# Patient Record
Sex: FEMALE | Race: WHITE | Employment: PART TIME | URBAN - NONMETROPOLITAN AREA
[De-identification: names, ages, dates, MRNs, and addresses within clinical notes are randomized per-mention and may not be internally consistent; named-entity substitution may affect disease eponyms.]

---

## 2017-08-24 ENCOUNTER — HOSPITAL ENCOUNTER (OUTPATIENT)
Dept: PHYSICAL THERAPY | Age: 42
Discharge: HOME | End: 2017-08-31
Admitting: ORTHOPAEDIC SURGERY

## 2018-01-05 ENCOUNTER — HOSPITAL ENCOUNTER (OUTPATIENT)
Dept: MAMMOGRAPHY | Age: 43
Discharge: HOME | End: 2018-01-05
Attending: OBSTETRICS & GYNECOLOGY | Admitting: OBSTETRICS & GYNECOLOGY

## 2018-01-05 DIAGNOSIS — Z12.31 ENCOUNTER FOR SCREENING MAMMOGRAM FOR BREAST CANCER: ICD-10-CM

## 2018-01-15 ENCOUNTER — HOSPITAL ENCOUNTER (OUTPATIENT)
Dept: MAMMOGRAPHY | Age: 43
Discharge: HOME | End: 2018-01-15
Admitting: NURSE PRACTITIONER

## 2018-01-15 DIAGNOSIS — R92.8 OTHER ABNORMAL AND INCONCLUSIVE FINDINGS ON DIAGNOSTIC IMAGING OF BREAST: ICD-10-CM

## 2018-01-15 DIAGNOSIS — Z12.31 ENCOUNTER FOR SCREENING MAMMOGRAM FOR BREAST CANCER: ICD-10-CM

## 2018-02-14 ENCOUNTER — HOSPITAL ENCOUNTER (OUTPATIENT)
Dept: PHYSICAL THERAPY | Age: 43
Discharge: HOME | End: 2018-02-28
Admitting: NURSE PRACTITIONER

## 2018-02-14 NOTE — FLOWSHEET NOTE
face-to-face)  [] EVAL (HIGH) 75699 (typically 45 minutes face-to-face)  [] RE-EVAL     [x] LN(55394) x  2   [] Ionto  [] NMR (77146) x      [] Vaso  [] Manual (57468) x       [] Mech Traction  [] ES (unattended):   [] Other:     GOALS:  Therapist goals for Patient:   Short Term Goals: To be achieved in: 2 weeks  1. Independent in HEP and progression per patient tolerance, in order to prevent re-injury. 2. Patient will have a decrease in pain to facilitate improvement in movement, function, and ADLs as indicated by Functional Deficits.     Long Term Goals: To be achieved in: 8 weeks  1. Disability index score of 10% or less for the Quick Dash to assist with reaching prior level of function. 2. Patient will demonstrate increased AROM to equal the opposite side bilaterally to allow for proper joint functioning as indicated by patients Functional Deficits. 3. Patient will demonstrate an increase in strength to UE MMT scores to equal bilaterally to allow for proper functional mobility as indicated by patients Functional Deficits. 4. Patient will return to all transfers, work activities, and functional activities without increased symptoms or restriction. 5. Patient will have 0/10 pain with ADL's.  6. Patient stated goal: Lift, type, sleep w/ no disturbances. Goals that are underlined signify the goal has been accomplished. Progression Towards Functional goals:  [] Patient is progressing as expected towards functional goals listed. [] Progression is slowed due to complexities listed. [] Progression has been slowed due to co-morbidities.   [x] Plan just implemented, too soon to assess goals progression  [] Other:     ASSESSMENT:  See eval    Treatment/Activity Tolerance:   [x] Patient tolerated treatment well [] Patient limited by fatique  [] Patient limited by pain  [] Patient limited by other medical complications  [] Other:     Prognosis: [] Good [] Fair  [] Poor    Patient Requires Follow-up: [x]

## 2018-02-14 NOTE — PLAN OF CARE
[facility], [address] [place] [address] [address] [facility], [address], [place], [address] [place] [address]  Phone: [phone], [phone]                                                    Physical Therapy Certification    Dear Referring Practitioner: David Lopez CNP,    We had the pleasure of evaluating the following patient for physical therapy services at [address] [place] [address]. A summary of our findings can be found in the initial assessment below. This includes our plan of care. If you have any questions or concerns regarding these findings, please do not hesitate to contact me at the office phone number checked above. Thank you for the referral.       Physician Signature:_______________________________Date:__________________  By signing above (or electronic signature), therapists plan is approved by physician      Patient: Anne Johnson   : 1975   MRN: [id number]  Referring Physician: Referring Practitioner: David Lopez CNP      Evaluation Date: 2018      Medical Diagnosis Information:  Diagnosis: R lateral epicondylitis    Treatment Diagnosis: M77.11 M25.621                                         Insurance information: PT Insurance Information: Med mutual    Precautions/ Contra-indications/Relevant Medical History:   Latex Allergy:  [x]NO      []YES   Preferred Language for Healthcare:   [x]English       []other:     SUBJECTIVE: Patient stated complaint: Patient reports right elbow pain beginning in  (early). She reports no known mechanism of injury but started to notice some discomfort when pouring something, lifting, opening a jar, etc. She was given an oral anti inflammatory which she reports isn't helping at this time. No N/T at this time, no previous neck neck pain, EMG came back negative. Just purchased a new chair for her work and is going to see if this new position will help her elbow.      Functional Disability Index: PT G-Codes  Functional Assessment Tool Used: Villarreal Decent  Score: 48%  Functional Limitation: Carrying, moving and handling objects  Carrying, Moving and Handling Objects Current Status (): At least 40 percent but less than 60 percent impaired, limited or restricted  Carrying, Moving and Handling Objects Goal Status (): 0 percent impaired, limited or restricted    Pain Scale: 6/10  Easing factors: avoiding usage   Provocative factors: opening jar, lifting, sleeping     Type: [x]Constant   []Intermittent  []Radiating []Localized []other:     Numbness/Tingling: None    Functional Limitations/Impairments: [x]Lifting/reaching [x]Grooming [x]Carrying    [x]ADL's []Driving []Sports/Recreations   []Other:    Occupation/School:     Living Status/Prior Level of Function: Independent with ADLs and IADLs. OBJECTIVE:     CERV ROM     Cervical Flexion     Cervical Extension     Cervical SB     Cervical rotation          ROM Left Right   Shoulder Flex 165 °  165 °    Shoulder Abd     Shoulder ER T2 T2   Shoulder IR T7 T7   Elbow Flex     Elbow Ext     Wrist Flex 78 °  70 °    Wrist Ext 70 °  70 °    Strength  Left Right   Shoulder Flex 8.8 lbs 7.4 lbs   Shoulder Scap 8 lbs 7.6 lbs   Shoulder ER 10 lbs 9 lbs   Shoulder IR     Elbow Flex     Elbow Ext     Wrist Flex     Wrist Ext     Afshin  38 lbs 25 lbs     Reflexes/Sensation (myotomes/dermatomes):    []Normal    []Abnormal:      Joint mobility:    []Normal    []Hypo   []Hyper    Palpation: Pain over the lateral epicondyle     Functional Mobility/Transfers: WNL    Posture: WNL    Bandages/Dressings/Incisions: na    Gait (include devices/WB status): No bracing    Orthopedic Special Tests: [place] (+)    [x] Patient history, allergies, meds reviewed. Medical chart reviewed. See intake form. Review Of Systems (ROS):  [x]Performed Review of systems (Integumentary, CardioPulmonary, Neurological) by intake and observation.  Intake form has been scanned into medical record. Patient has been instructed to contact their primary care physician regarding ROS issues if not already being addressed at this time. Co-morbidities/Complexities (which will affect course of rehabilitation):   [x]None           Arthritic conditions   []Rheumatoid arthritis (M05.9)  []Osteoarthritis (M19.91)   Cardiovascular conditions   []Hypertension (I10)  []Hyperlipidemia (E78.5)  []Angina pectoris (I20)  []Atherosclerosis (I70)   Musculoskeletal conditions   []Disc pathology   []Congenital spine pathologies   []Prior surgical intervention  []Osteoporosis (M81.8)  []Osteopenia (M85.8)   Endocrine conditions   []Hypothyroid (E03.9)  []Hyperthyroid Gastrointestinal conditions   []Constipation (N35.40)   Metabolic conditions   []Morbid obesity (E66.01)  []Diabetes type 1(E10.65) or 2 (E11.65)   []Neuropathy (G60.9)     Pulmonary conditions   []Asthma (J45)  []Coughing   []COPD (J44.9)   Psychological Disorders  []Anxiety (F41.9)  []Depression (F32.9)   []Other:   []Other:          Barriers to/and or personal factors that will affect rehab potential:              []Age  []Sex              []Motivation/Lack of Motivation                        []Co-Morbidities              []Cognitive Function, education/learning barriers              []Environmental, home barriers              [x]profession/work barriers  []past PT/medical experience  []other:  Justification: Full time     Falls Risk Assessment (30 days):   [x] Falls Risk assessed and no intervention required. [] Falls Risk assessed and Patient requires intervention due to being higher risk   TUG score (>12s at risk):     [] Falls education provided, including       G-Codes:  PT G-Codes  Functional Assessment Tool Used: Quick Dash  Score: 48%  Functional Limitation: Carrying, moving and handling objects  Carrying, Moving and Handling Objects Current Status ():  At least 40 percent but less than 60 percent impaired, limited or restricted  Carrying, Moving and Handling Objects Goal Status (): 0 percent impaired, limited or restricted    ASSESSMENT:   Functional Impairments   []Noted spinal or UE joint hypomobility   []Noted spinal or UE joint hypermobility   [x]Decreased UE functional ROM   [x]Decreased UE functional strength   []Abnormal reflexes/sensation/myotomal/dermatomal deficits   []Decreased RC/scapular/core strength and neuromuscular control   []other:      Functional Activity Limitations (from functional questionnaire and intake)   []Reduced ability to tolerate prolonged functional positions   []Reduced ability or difficulty with changes of positions or transfers between positions   []Reduced ability to maintain good posture and demonstrate good body mechanics with sitting, bending, and lifting   [x] Reduced ability or tolerance with driving and/or computer work   [x]Reduced ability to sleep   []Reduced ability to perform lifting, reaching, carrying tasks   []Reduced ability to tolerate impact through UE   []Reduced ability to reach behind back   [x]Reduced ability to  or hold objects   []Reduced ability to throw or toss an object   []other:    Participation Restrictions   []Reduced participation in self care activities   [x]Reduced participation in home management activities   [x]Reduced participation in work activities   [x]Reduced participation in social activities. []Reduced participation in sport/recreation activities. Classification:   []Signs/symptoms consistent with post-surgical status including decreased ROM, strength and function.   [x]Signs/symptoms consistent with joint sprain/strain   []Signs/symptoms consistent with shoulder impingement   [x]Signs/symptoms consistent with shoulder/elbow/wrist tendinopathy   []Signs/symptoms consistent with Rotator cuff tear   []Signs/symptoms consistent with labral tear   []Signs/symptoms consistent with postural dysfunction    []Signs/symptoms consistent with

## 2018-02-22 ENCOUNTER — HOSPITAL ENCOUNTER (OUTPATIENT)
Dept: PHYSICAL THERAPY | Age: 43
Discharge: HOME | End: 2018-02-23
Admitting: NURSE PRACTITIONER

## 2018-02-22 NOTE — FLOWSHEET NOTE
Therapeutic Exercise and NMR EXR  [x] (60742) Provided verbal/tactile cueing for activities related to strengthening, flexibility, endurance, ROM  for improvements in scapular, scapulothoracic and UE control with self care, reaching, carrying, lifting, house/yardwork, driving/computer work. [x] (56767) Provided verbal/tactile cueing for activities related to improving balance, coordination, kinesthetic sense, posture, motor skill, proprioception  to assist with  scapular, scapulothoracic and UE control with self care, reaching, carrying, lifting, house/yardwork, driving/computer work.     Home Exercise Program:    [x] (89272) Reviewed/Progressed HEP activities related to strengthening, flexibility, endurance, ROM of scapular, scapulothoracic and UE control with self care, reaching, carrying, lifting, house/yardwork, driving/computer work  [x] (03718) Reviewed/Progressed HEP activities related to improving balance, coordination, kinesthetic sense, posture, motor skill, proprioception of scapular, scapulothoracic and UE control with self care, reaching, carrying, lifting, house/yardwork, driving/computer work      Manual Treatments:  PROM / STM / Oscillations-Mobs:  G-I, II, III, IV (PA's, Inf., Post.)  [x] (89946) Provided manual therapy to mobilize soft tissue/joints of cervical/CT, scapular GHJ and UE for the purpose of modulating pain, promoting relaxation,  increasing ROM, reducing/eliminating soft tissue swelling/inflammation/restriction, improving soft tissue extensibility and allowing for proper ROM for normal function with self care, reaching, carrying, lifting, house/yardwork, driving/computer work    Modalities:  CP 10' / Soraida Shrestha take home patch     Charges:  Timed Code Treatment Minutes: 45   Total Treatment Minutes: 60     [] EVAL (LOW) 83105 (typically 20 minutes face-to-face)  [] EVAL (MOD) 90323 (typically 30 minutes face-to-face)  [] EVAL (HIGH) 86054 (typically 45 minutes face-to-face)  []

## 2018-03-01 ENCOUNTER — HOSPITAL ENCOUNTER (OUTPATIENT)
Dept: PHYSICAL THERAPY | Age: 43
Discharge: HOME | End: 2018-03-02
Admitting: NURSE PRACTITIONER

## 2018-03-01 ENCOUNTER — HOSPITAL ENCOUNTER (OUTPATIENT)
Dept: PHYSICAL THERAPY | Age: 43
Discharge: HOME | End: 2018-03-31
Attending: NURSE PRACTITIONER | Admitting: NURSE PRACTITIONER

## 2018-03-01 NOTE — FLOWSHEET NOTE
strengthening     PNF for agonist/antagonist inhibition          Pt education 10 min Provided biomechanics/ergonomics training to reduce stress across injured/healing structures. Ionto education     Therapeutic Exercise and NMR EXR  [x] (16767) Provided verbal/tactile cueing for activities related to strengthening, flexibility, endurance, ROM  for improvements in scapular, scapulothoracic and UE control with self care, reaching, carrying, lifting, house/yardwork, driving/computer work. [x] (48129) Provided verbal/tactile cueing for activities related to improving balance, coordination, kinesthetic sense, posture, motor skill, proprioception  to assist with  scapular, scapulothoracic and UE control with self care, reaching, carrying, lifting, house/yardwork, driving/computer work.     Home Exercise Program:    [x] (33835) Reviewed/Progressed HEP activities related to strengthening, flexibility, endurance, ROM of scapular, scapulothoracic and UE control with self care, reaching, carrying, lifting, house/yardwork, driving/computer work  [x] (01322) Reviewed/Progressed HEP activities related to improving balance, coordination, kinesthetic sense, posture, motor skill, proprioception of scapular, scapulothoracic and UE control with self care, reaching, carrying, lifting, house/yardwork, driving/computer work      Manual Treatments:  PROM / STM / Oscillations-Mobs:  G-I, II, III, IV (PA's, Inf., Post.)  [x] (52855) Provided manual therapy to mobilize soft tissue/joints of cervical/CT, scapular GHJ and UE for the purpose of modulating pain, promoting relaxation,  increasing ROM, reducing/eliminating soft tissue swelling/inflammation/restriction, improving soft tissue extensibility and allowing for proper ROM for normal function with self care, reaching, carrying, lifting, house/yardwork, driving/computer work    Modalities:  CP 10' / Su Raines take home patch     Charges:  Timed Code Treatment Minutes: 45   Total Treatment Minutes: 60     [] EVAL (LOW) 84540 (typically 20 minutes face-to-face)  [] EVAL (MOD) 94188 (typically 30 minutes face-to-face)  [] EVAL (HIGH) 43994 (typically 45 minutes face-to-face)  [] RE-EVAL     [x] SX(58041) x  2   [x] Ionto  [] NMR (83383) x      [] Vaso  [x] Manual (11674) x  1    [] Mech Traction  [] ES (unattended):   [] Other:     GOALS:  Therapist goals for Patient:   Short Term Goals: To be achieved in: 2 weeks  1. Independent in HEP and progression per patient tolerance, in order to prevent re-injury. 2. Patient will have a decrease in pain to facilitate improvement in movement, function, and ADLs as indicated by Functional Deficits.     Long Term Goals: To be achieved in: 8 weeks  1. Disability index score of 10% or less for the Quick Dash to assist with reaching prior level of function. 2. Patient will demonstrate increased AROM to equal the opposite side bilaterally to allow for proper joint functioning as indicated by patients Functional Deficits. 3. Patient will demonstrate an increase in strength to UE MMT scores to equal bilaterally to allow for proper functional mobility as indicated by patients Functional Deficits. 4. Patient will return to all transfers, work activities, and functional activities without increased symptoms or restriction. 5. Patient will have 0/10 pain with ADL's.  6. Patient stated goal: Lift, type, sleep w/ no disturbances. Goals that are underlined signify the goal has been accomplished. Progression Towards Functional goals:  [] Patient is progressing as expected towards functional goals listed. [x] Progression is slowed due to complexities listed. [] Progression has been slowed due to co-morbidities.   [] Plan just implemented, too soon to assess goals progression  [] Other:     ASSESSMENT:  See eval    Treatment/Activity Tolerance:   [x] Patient tolerated treatment well [] Patient limited by fatique  [] Patient limited by pain  [] Patient limited by other medical complications  [] Other:     Prognosis: [] Good [] Fair  [] Poor    Patient Requires Follow-up: [x] Yes  [] No    PLAN: See eval  [x] Continue per plan of care [] Alter current plan (see comments)  [] Plan of care initiated [] Hold pending MD visit [] Discharge    Electronically signed by: Marylu Boogie PTA

## 2018-03-07 ENCOUNTER — HOSPITAL ENCOUNTER (OUTPATIENT)
Dept: PHYSICAL THERAPY | Age: 43
Discharge: HOME | End: 2018-03-08
Admitting: NURSE PRACTITIONER

## 2018-04-01 ENCOUNTER — HOSPITAL ENCOUNTER (OUTPATIENT)
Dept: PHYSICAL THERAPY | Age: 43
Discharge: HOME | End: 2018-04-30
Attending: NURSE PRACTITIONER | Admitting: NURSE PRACTITIONER

## 2019-02-08 ENCOUNTER — HOSPITAL ENCOUNTER (OUTPATIENT)
Dept: MAMMOGRAPHY | Age: 44
Discharge: HOME | End: 2019-02-08
Payer: COMMERCIAL

## 2019-02-08 DIAGNOSIS — Z12.31 SCREENING MAMMOGRAM, ENCOUNTER FOR: ICD-10-CM

## 2019-02-08 PROCEDURE — 77067 SCR MAMMO BI INCL CAD: CPT

## 2020-07-17 ENCOUNTER — TELEPHONE (OUTPATIENT)
Dept: MAMMOGRAPHY | Age: 45
End: 2020-07-17

## 2020-07-17 ENCOUNTER — HOSPITAL ENCOUNTER (OUTPATIENT)
Dept: MAMMOGRAPHY | Age: 45
Discharge: HOME | End: 2020-07-17
Payer: COMMERCIAL

## 2020-07-17 PROCEDURE — 77063 BREAST TOMOSYNTHESIS BI: CPT

## 2021-08-02 ENCOUNTER — HOSPITAL ENCOUNTER (OUTPATIENT)
Dept: MAMMOGRAPHY | Age: 46
Discharge: HOME | End: 2021-08-02
Payer: COMMERCIAL

## 2021-08-02 DIAGNOSIS — Z12.31 SCREENING MAMMOGRAM, ENCOUNTER FOR: ICD-10-CM

## 2021-08-02 PROCEDURE — 77063 BREAST TOMOSYNTHESIS BI: CPT

## 2021-08-03 ENCOUNTER — TELEPHONE (OUTPATIENT)
Dept: MAMMOGRAPHY | Age: 46
End: 2021-08-03

## 2022-08-30 ENCOUNTER — HOSPITAL ENCOUNTER (OUTPATIENT)
Dept: MAMMOGRAPHY | Age: 47
Discharge: HOME | End: 2022-08-30
Payer: COMMERCIAL

## 2022-08-30 ENCOUNTER — TELEPHONE (OUTPATIENT)
Dept: MAMMOGRAPHY | Age: 47
End: 2022-08-30

## 2022-08-30 DIAGNOSIS — Z12.39 SCREENING BREAST EXAMINATION: ICD-10-CM

## 2022-08-30 PROCEDURE — 77063 BREAST TOMOSYNTHESIS BI: CPT

## 2023-01-27 ENCOUNTER — HOSPITAL ENCOUNTER (OUTPATIENT)
Dept: PHYSICAL THERAPY | Age: 48
Setting detail: THERAPIES SERIES
Discharge: HOME | End: 2023-01-27
Payer: COMMERCIAL

## 2023-01-27 PROCEDURE — 97161 PT EVAL LOW COMPLEX 20 MIN: CPT

## 2023-01-27 PROCEDURE — 97140 MANUAL THERAPY 1/> REGIONS: CPT

## 2023-01-27 PROCEDURE — 97110 THERAPEUTIC EXERCISES: CPT

## 2023-01-27 NOTE — PLAN OF CARE
[address], [address], [address]  Phone: [phone], [phone]    Physical Therapy Certification    Dear Puneet Jama,*,    We had the pleasure of evaluating the following patient for physical therapy services at [address] [place] [address]. A summary of our findings can be found in the initial assessment below. This includes our plan of care. If you have any questions or concerns regarding these findings, please do not hesitate to contact me at the office phone number checked above. Thank you for the referral.       Physician Signature:_______________________________Date:__________________  By signing above (or electronic signature), therapists plan is approved by physician    Patient: Miguel Barnett   : 1975   MRN: [id number]  Referring Physician: Puneet Jama,*     Evaluation Date: 2023      Medical Diagnosis Information:  Diagnosis: Low back pain M54.5   Treatment Diagnosis: M54.5 low back pain; generalized weakness M62.8                                         Insurance information: PT Insurance Information: [place] DED $550 BMN no auth     Precautions/ Contra-indications: none  Latex Allergy:  [x]NO      []YES  Preferred Language for Healthcare:   [x]English       []Other:    C-SSRS Triggered by Intake questionnaire (Past 2 wk assessment):   [x] No, Questionnaire did not trigger screening.   [] Yes, Patient intake triggered further evaluation      [] C-SSRS Screening completed  [] PCP notified via Plan of Care  [] Emergency services notified     SUBJECTIVE: Patient stated complaint: Patient reports she has chronic low back pain from scoliosis. Feels it pinching on left side. No pain down legs. Chiropractor about 6 months and has not gotten much help. [facility] and had [facility].  Has not tried formal PT in the past.     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 Returning upright    extension 80%   side bend P! To left 50%     Prone exam Comments   PA/spring P! Left lumbar L3-4   Sacral spring Normal   Palpation P! L L3-4 paraspinals     Reflexes/Sensation:    []Dermatomes/Myotomes intact    []UE Reflexes     []Normal []Hypo      []Hyper   []LE Reflexes     []Normal []Hypo      []Hyper   []Babinski/Clonus/Hoffmans:    []Other:    Functional Mobility/Transfers: independent     Posture: anterior pelvic tilt    Gait: (include devices/WB status) slight wide NAGI    Orthopedic Special Tests: none                       [x] Patient history, allergies, meds reviewed. Medical chart reviewed. See intake form. Review Of Systems (ROS):  [x]Performed Review of systems (Integumentary, CardioPulmonary, Neurological) by intake and observation. Intake form has been scanned into medical record. Patient has been instructed to contact their primary care physician regarding ROS issues if not already being addressed at this time.       Co-morbidities/Complexities (which will affect course of rehabilitation):   []None           Arthritic conditions   []Rheumatoid arthritis (M05.9)  [x]Osteoarthritis (M19.91)   Cardiovascular conditions   []Hypertension (I10)  []Hyperlipidemia (E78.5)  []Angina pectoris (I20)  []Atherosclerosis (I70)   Musculoskeletal conditions   []Disc pathology   []Congenital spine pathologies   []Prior surgical intervention  []Osteoporosis (M81.8)  []Osteopenia (M85.8)   Endocrine conditions   []Hypothyroid (E03.9)  []Hyperthyroid Gastrointestinal conditions   []Constipation (D93.64)   Metabolic conditions   []Morbid obesity (E66.01)  []Diabetes type 1(E10.65) or 2 (E11.65)   []Neuropathy (G60.9)     Pulmonary conditions   []Asthma (J45)  []Coughing   []COPD (J44.9)   Psychological Disorders  []Anxiety (F41.9)  []Depression (F32.9)   []Other:   []Other:          Barriers to/and or personal factors that will affect rehab potential:              [x]Age  []Sex              [x]Motivation/Lack of Motivation                        [x]Co-Morbidities              []Cognitive Function, education/learning barriers              []Environmental, home barriers              []profession/work barriers  [x]past PT/medical experience  []other:  Justification:      ASSESSMENT:   Functional Impairments:     [x]Noted lumbar/proximal hip hypomobility   []Noted lumbosacral and/or generalized hypermobility   [x]Decreased Lumbosacral/hip/LE functional ROM   [x]Decreased core/proximal hip strength and neuromuscular control    [x]Decreased LE functional strength    []Abnormal reflexes/sensation/myotomal/dermatomal deficits  [x]Reduced balance/proprioceptive control    []other:      Functional Activity Limitations (from functional questionnaire and intake)   [x]Reduced ability to tolerate prolonged functional positions   [x]Reduced ability or difficulty with changes of positions or transfers between positions   [x]Reduced ability to maintain good posture and demonstrate good body mechanics with sitting, bending, and lifting   [x]Reduced ability to sleep   [x] Reduced ability or tolerance with driving and/or computer work   [x]Reduced ability to perform lifting, reaching, carrying tasks   [x]Reduced ability to squat   [x]Reduced ability to forward bend   [x]Reduced ability to ambulate prolonged functional periods/distances/surfaces   [x]Reduced ability to ascend/descend stairs   []other:       Participation Restrictions   [x]Reduced participation in self care activities   [x]Reduced participation in home management activities   [x]Reduced participation in work activities   [x]Reduced participation in social activities. [x]Reduced participation in sport/recreation activities. Classification:   [x]Signs/symptoms consistent with Lumbar instability/stabilization subgroup. [x]Signs/symptoms consistent with Lumbar mobilization/manipulation subgroup, myotomes and dermatomes intact. Meets manipulation criteria. []Signs/symptoms consistent with Lumbar direction specific/centralization subgroup   []Signs/symptoms consistent with Lumbar traction subgroup     []Signs/symptoms consistent with lumbar facet dysfunction   []Signs/symptoms consistent with lumbar stenosis type dysfunction   []Signs/symptoms consistent with nerve root involvement including myotome & dermatome dysfunction   []Signs/symptoms consistent with post-surgical status including: decreased ROM, strength and function.    []signs/symptoms consistent with pathology which may benefit from Dry needling     []other:      Prognosis/Rehab Potential:      []Excellent   [x]Good    []Fair   []Poor    Tolerance of evaluation/treatment:    []Excellent   [x]Good    []Fair   []Poor  Physical Therapy Evaluation Complexity Justification  [x] A history of present problem with:  [] no personal factors and/or comorbidities that impact the plan of care;  []1-2 personal factors and/or comorbidities that impact the plan of care  [x]3 personal factors and/or comorbidities that impact the plan of care  [x] An examination of body systems using standardized tests and measures addressing any of the following: body structures and functions (impairments), activity limitations, and/or participation restrictions;:  [] a total of 1-2 or more elements   [] a total of 3 or more elements   [x] a total of 4 or more elements   [x] A clinical presentation with:  [x] stable and/or uncomplicated characteristics   [] evolving clinical presentation with changing characteristics  [] unstable and unpredictable characteristics;   [x] Clinical decision making of [x] low, [] moderate, [] high complexity using standardized patient assessment instrument and/or measurable assessment of functional outcome. [x] EVAL (LOW) 56782 (typically 20 minutes face-to-face)  [] EVAL (MOD) 11775 (typically 30 minutes face-to-face)  [] EVAL (HIGH) 67111 (typically 45 minutes face-to-face)  [] RE-EVAL     PLAN: Begin PT focusing on: proximal hip mobilizations, LB mobs, LB core activation, proximal hip activation, and HEP    Frequency/Duration:  1-2 days per week for 12 Weeks:  Interventions:  [x]  Therapeutic exercise including: strength training, ROM, for LE, Glutes and core   [x]  NMR activation and proprioception for glutes , LE and Core   [x]  Manual therapy as indicated for Hip complex, LE and spine to include: Dry Needling/IASTM, STM, PROM, Gr I-IV mobilizations, manipulation. [x]  Modalities as needed that may include: thermal agents, E-stim, Biofeedback, US, iontophoresis as indicated  [x]  Patient education on joint protection, postural re-education, activity modification, progression of HEP. GOALS:    Patient stated goal: Pain free activity and care for grandchildren   [] Progressing: [] Met: [] Not Met: [] Adjusted     Therapist goals for Patient:   Short Term Goals: To be achieved in: 2 weeks  1. Independent in HEP and progression per patient tolerance, in order to prevent re-injury. [] Progressing: [] Met: [] Not Met: [] Adjusted   2. Patient will have a decrease in pain to facilitate improvement in movement, function, and ADLs as indicated by Functional Deficits. [] Progressing: [] Met: [] Not Met: [] Adjusted     Long Term Goals: To be achieved in: 12 weeks  1. Disability index score of 22% or less on oswestry to assist with reaching prior level of function. [] Progressing: [] Met: [] Not Met: [] Adjusted   2.  Patient will demonstrate increased AROM to WNL, good LS mobility, good hip ROM to allow for proper joint functioning as indicated by patients Functional Deficits. [] Progressing: [] Met: [] Not Met: [] Adjusted   3. Patient will demonstrate an increase in Strength to good proximal hip and core activation to allow for proper functional mobility as indicated by patients Functional Deficits. [] Progressing: [] Met: [] Not Met: [] Adjusted   4. Patient will return to dressing and hygiene ADLs  without increased symptoms or restriction.    [] Progressing: [] Met: [] Not Met: [] Adjusted           Electronically signed by:  Loraine Perez, PT DPT [id number]

## 2023-01-27 NOTE — FLOWSHEET NOTE
[address] [facility] and [address] [place] [address] [address] [facility], [address] [place] [address]  Phone: [phone], [phone]    Physical Therapy Daily Treatment Note  Date:  2023    Patient Name:  Ely Bernheim    :  1975  MRN: [id number]  Restrictions/Precautions:    Physician Information:  Milka Chowdhury,*  Medical/Treatment Diagnosis Information:  Diagnosis: Low back pain M54.5  Treatment Diagnosis: M54.5 low back pain; generalized weakness M62.8  [x] Conservative / [] Surgical - DOS:  Therapy Diagnosis/Practice Pattern:  Practice Pattern F: Spinal Disorders  Insurance/Certification information:  PT Insurance Information: [place] DED $550 BMN no auth  Plan of care signed: [] YES  [x] NO  Number of Comorbidities:  []0     [x]1-2    []3+  Date of Patient follow up with Physician:     Is this a Progress Report:     []  Yes  [x]  No        If Yes:  Date Range for reporting period:  Beginning 2023  Ending     Progress report will be due (10 Rx or 30 days whichever is less): 9983       Recertification will be due (POC Duration  / 90 days whichever is less): 2023      Latex Allergy:  [x]NO      []YES  Preferred Language for Healthcare:   [x]English       []other:    Visit # Insurance Allowable   1 BMN  auth []no        []yes:       SUBJECTIVE:  See eval    OBJECTIVE: See eval  Observation:  Palpation:    Test used Initial score Current Score   VAS     FOTO     Lumbar Flexion     Lumbar Ext     Lumbar SB L/R     Lumbar rotation L/R     Hip ext     ABD     TrA       RESTRICTIONS/PRECAUTIONS:     Exercises/Interventions:     Therapeutic Ex/NMR re-education  sets/reps Notes   LTR 15 x    HS floss 15 x    Alissa pose 3D 3 x 15\"    TA brace 15 x    Bridge 2 x 10                                  Shoulder hep review     PT education on anatomy, scoliosis, pathology, PT progression 10 min    Manual Intervention      Joint mobs grade 1-2, STM to paraspinals, lateral gapping R side lying 15 min Focused L L3-4                    Access Code: [email or web address]  URL: [email or web address]  Date: 01/27/2023  Prepared by: Kavon Correa    Exercises  Supine Lower Trunk Rotation - 1 x daily - 15 reps - 3 hold  Hamstring floss - 1 x daily - 15 reps - 3 hold  Child's Pose Stretch - 1 x daily - 3 reps - 15 hold  Child's Pose with Sidebending - 1 x daily - 3 reps - 15 hold  Supine Transversus Abdominis Bracing - Hands on Stomach - 1 x daily - 15 reps - 5 hold  Supine Bridge - 1 x daily - 2 sets - 10 reps - 3 hold      Therapeutic Exercise and NMR EXR  [x] (64370) Provided verbal/tactile cueing for activities related to strengthening, flexibility, endurance, ROM  for improvements in proximal hip and core control with self care, mobility, lifting and ambulation.  [] (58088) Provided verbal/tactile cueing for activities related to improving balance, coordination, kinesthetic sense, posture, motor skill, proprioception  to assist with core control in self care, mobility, lifting, and ambulation.      Therapeutic Activities:    [] (07634 or 28480) Provided verbal/tactile cueing for activities related to improving balance, coordination, kinesthetic sense, posture, motor skill, proprioception and motor activation to allow for proper function  with self care and ADLs  [] (75236) Provided training and instruction to the patient for proper core and proximal hip recruitment and positioning with ambulation re-education     Home Exercise Program:    [x] (32749) Reviewed/Progressed HEP activities related to strengthening, flexibility, endurance, ROM of core, proximal hip and LE for functional self-care, mobility, lifting and ambulation   [] (91174) Reviewed/Progressed HEP activities related to improving balance, coordination, kinesthetic sense, posture, motor skill, proprioception of core, proximal hip and LE for self care, mobility, lifting, and ambulation      Manual Treatments:  PROM / STM / Oscillations-Mobs:  G-I, II, III, IV (PA's, Inf., Post.)  [x] (31832) Provided manual therapy to mobilize proximal hip and LS spine soft tissue/joints for the purpose of modulating pain, promoting relaxation,  increasing ROM, reducing/eliminating soft tissue swelling/inflammation/restriction, improving soft tissue extensibility and allowing for proper ROM for normal function with self care, mobility, lifting and ambulation. Modalities:      [] GR/ESU 15 min    [] GR 15 min  [] ESU     [] CP    [] MHP    [x] declined  Charges:  Timed Code Treatment Minutes: 40   Total Treatment Minutes: 45     [x] EVAL (LOW) 44187 (typically 20 minutes face-to-face)  [] EVAL (MOD) 13149 (typically 30 minutes face-to-face)  [] EVAL (HIGH) 38457 (typically 45 minutes face-to-face)  [] RE-EVAL     [x] KI(83396) x 2    [] IONTO  [] NMR (89401) x     [] VASO  [x] Manual (51544) x 1     [] Other:  [] TA x      [] Mech Traction (25895)  [] ES(attended) (83953)      [] ES (un) (65592):     Goals: Patient stated goal: Pain free activity and care for grandchildren   [] Progressing: [] Met: [] Not Met: [] Adjusted     Therapist goals for Patient:   Short Term Goals: To be achieved in: 2 weeks  1. Independent in HEP and progression per patient tolerance, in order to prevent re-injury. [] Progressing: [] Met: [] Not Met: [] Adjusted   2. Patient will have a decrease in pain to facilitate improvement in movement, function, and ADLs as indicated by Functional Deficits. [] Progressing: [] Met: [] Not Met: [] Adjusted     Long Term Goals: To be achieved in: 12 weeks  1. Disability index score of 22% or less on oswestry to assist with reaching prior level of function. [] Progressing: [] Met: [] Not Met: [] Adjusted   2. Patient will demonstrate increased AROM to WNL, good LS mobility, good hip ROM to allow for proper joint functioning as indicated by patients Functional Deficits. [] Progressing: [] Met: [] Not Met: [] Adjusted   3.  Patient will demonstrate an increase in Strength to good proximal hip and core activation to allow for proper functional mobility as indicated by patients Functional Deficits. [] Progressing: [] Met: [] Not Met: [] Adjusted   4. Patient will return to dressing and hygiene ADLs  without increased symptoms or restriction. [] Progressing: [] Met: [] Not Met: [] Adjusted        Overall Progression Towards Functional goals/ Treatment Progress Update:  [] Patient is progressing as expected towards functional goals listed. [] Progression is slowed due to complexities/Impairments listed. [] Progression has been slowed due to co-morbidities. [x] Plan just implemented, too soon to assess goals progression <30days   [] Goals require adjustment due to lack of progress  [] Patient is not progressing as expected and requires additional follow up with physician  [] Other    Prognosis for POC: [x] Good [] Fair  [] Poor      Patient requires continued skilled intervention: [x] Yes  [] No      ASSESSMENT:  See eval    Treatment/Activity Tolerance:  [x] Patient tolerated treatment well [] Patient limited by fatigue  [] Patient limited by pain  [] Patient limited by other medical complications  [] Other:       PLAN: See eval  [] Continue per plan of care [] Alter current plan (see comments above)  [x] Plan of care initiated [] Hold pending MD visit [] Discharge      Electronically signed by:  Patti Skinner PT , DPT [id number]    Note: If patient does not return for scheduled/ recommended follow up visits, this note will serve as a discharge from care along with most recent update on progress.

## 2023-01-30 ENCOUNTER — HOSPITAL ENCOUNTER (OUTPATIENT)
Dept: PHYSICAL THERAPY | Age: 48
Setting detail: THERAPIES SERIES
Discharge: HOME | End: 2023-01-30
Payer: COMMERCIAL

## 2023-01-30 NOTE — FLOWSHEET NOTE
[facility]    Physical Therapy  Cancellation/No-show Note  Patient Name:  Lakesha Rosado  :  1975   Date:  2023    Cancelled visits to date: 1  No-shows to date: 0    For today's appointment patient:  [x]  Cancelled  []  Rescheduled appointment  []  No-show     Reason given by patient:  [x]  Patient ill  []  Conflicting appointment  []  No transportation    []  Conflict with work  []  No reason given  []  Other:     Comments:  migraine     Phone call information:   []  Phone call made today to patient. []  Patient answered, conversation as follows:    []  Patient did not answer. [x]  Phone call not needed - pt contacted us to cancel and provided reason for cancellation.      Electronically signed by:  Subhash Mendenhall PT,

## 2023-02-01 ENCOUNTER — HOSPITAL ENCOUNTER (OUTPATIENT)
Dept: PHYSICAL THERAPY | Age: 48
Setting detail: THERAPIES SERIES
Discharge: HOME | End: 2023-02-01
Payer: COMMERCIAL

## 2023-02-01 PROCEDURE — 97140 MANUAL THERAPY 1/> REGIONS: CPT | Performed by: PHYSICAL THERAPY ASSISTANT

## 2023-02-01 PROCEDURE — 97110 THERAPEUTIC EXERCISES: CPT | Performed by: PHYSICAL THERAPY ASSISTANT

## 2023-02-01 NOTE — FLOWSHEET NOTE
723 [facility] and [address] [place] [address] 904 [facility], [address] [place] [address]  Phone: [phone], [phone]    Physical Therapy Daily Treatment Note  Date:  2023    Patient Name:  Elena Carter    :  1975  MRN: [id number]  Restrictions/Precautions:    Physician Information:  Shania Abraham,*  Medical/Treatment Diagnosis Information:  Diagnosis: Low back pain M54.5  Treatment Diagnosis: M54.5 low back pain; generalized weakness M62.8  [x] Conservative / [] Surgical - DOS:  Therapy Diagnosis/Practice Pattern:  Practice Pattern F: Spinal Disorders  Insurance/Certification information:  PT Insurance Information: [place] DED $550 BMN no auth  Plan of care signed: [] YES  [x] NO  Number of Comorbidities:  []0     [x]1-2    []3+  Date of Patient follow up with Physician:     Is this a Progress Report:     []  Yes  [x]  No        If Yes:  Date Range for reporting period:  Beginning 2023  Ending     Progress report will be due (10 Rx or 30 days whichever is less): 6983       Recertification will be due (POC Duration  / 90 days whichever is less): 2023      Latex Allergy:  [x]NO      []YES  Preferred Language for Healthcare:   [x]English       []other:    Visit # Insurance Allowable   2 BMN  auth []no        []yes:       SUBJECTIVE:  Did well after 1st visit. Doing well with HEP. States pain today isn't as severe but if she bends or stands a certain way she still gets pain. Left low back just above hip bone.      OBJECTIVE: See eval  Observation:  Palpation:    Test used Initial score Current Score   VAS     FOTO     Lumbar Flexion     Lumbar Ext     Lumbar SB L/R     Lumbar rotation L/R     Hip ext     ABD     TrA       RESTRICTIONS/PRECAUTIONS:     Exercises/Interventions:     Therapeutic Ex/NMR re-education  sets/reps Notes   LTR 20 x    HS floss 15 x    Single knee to chest  10\" x10         Juan Mangle pose 3D 3 x 15\"    TA brace 15 x Bridge 2 x 10    SLR with ab set  2x10     Sidelying clamshells  X20          T-Band B ER  Red x20     T-Band Row  Green x30          Shoulder hep review     PT education on anatomy, scoliosis, pathology, PT progression 10 min    Manual Intervention      Joint mobs grade 1-2, STM to paraspinals, lateral gapping R side lying 15 min Focused L L3-4   HS Stretch  5'                Access Code: [email or web address]  URL: [email or web address]  Date: 01/27/2023  Prepared by: Yolette Massey    Exercises  Supine Lower Trunk Rotation - 1 x daily - 15 reps - 3 hold  Hamstring floss - 1 x daily - 15 reps - 3 hold  Child's Pose Stretch - 1 x daily - 3 reps - 15 hold  Child's Pose with Sidebending - 1 x daily - 3 reps - 15 hold  Supine Transversus Abdominis Bracing - Hands on Stomach - 1 x daily - 15 reps - 5 hold  Supine Bridge - 1 x daily - 2 sets - 10 reps - 3 hold      Therapeutic Exercise and NMR EXR  [x] (30107) Provided verbal/tactile cueing for activities related to strengthening, flexibility, endurance, ROM  for improvements in proximal hip and core control with self care, mobility, lifting and ambulation.  [] (15215) Provided verbal/tactile cueing for activities related to improving balance, coordination, kinesthetic sense, posture, motor skill, proprioception  to assist with core control in self care, mobility, lifting, and ambulation.      Therapeutic Activities:    [] (46911 or 08129) Provided verbal/tactile cueing for activities related to improving balance, coordination, kinesthetic sense, posture, motor skill, proprioception and motor activation to allow for proper function  with self care and ADLs  [] (80040) Provided training and instruction to the patient for proper core and proximal hip recruitment and positioning with ambulation re-education     Home Exercise Program:    [x] (34369) Reviewed/Progressed HEP activities related to strengthening, flexibility, endurance, ROM of core, proximal hip and LE for functional self-care, mobility, lifting and ambulation   [] (50800) Reviewed/Progressed HEP activities related to improving balance, coordination, kinesthetic sense, posture, motor skill, proprioception of core, proximal hip and LE for self care, mobility, lifting, and ambulation      Manual Treatments:  PROM / STM / Oscillations-Mobs:  G-I, II, III, IV (PA's, Inf., Post.)  [x] (13334) Provided manual therapy to mobilize proximal hip and LS spine soft tissue/joints for the purpose of modulating pain, promoting relaxation,  increasing ROM, reducing/eliminating soft tissue swelling/inflammation/restriction, improving soft tissue extensibility and allowing for proper ROM for normal function with self care, mobility, lifting and ambulation. Modalities:      [] GR/ESU 15 min    [] GR 15 min  [] ESU     [] CP    [] MHP    [x] declined  Charges:  Timed Code Treatment Minutes: 45   Total Treatment Minutes: 45     [] EVAL (LOW) 04127 (typically 20 minutes face-to-face)  [] EVAL (MOD) 51952 (typically 30 minutes face-to-face)  [] EVAL (HIGH) 44654 (typically 45 minutes face-to-face)  [] RE-EVAL     [x] MA(72269) x 2    [] IONTO  [] NMR (48130) x     [] VASO  [x] Manual (12400) x 1     [] Other:  [] TA x      [] Mech Traction (43241)  [] ES(attended) (13001)      [] ES (un) (53089):     Goals: Patient stated goal: Pain free activity and care for grandchildren   [] Progressing: [] Met: [] Not Met: [] Adjusted     Therapist goals for Patient:   Short Term Goals: To be achieved in: 2 weeks  1. Independent in HEP and progression per patient tolerance, in order to prevent re-injury. [] Progressing: [] Met: [] Not Met: [] Adjusted   2. Patient will have a decrease in pain to facilitate improvement in movement, function, and ADLs as indicated by Functional Deficits. [] Progressing: [] Met: [] Not Met: [] Adjusted     Long Term Goals: To be achieved in: 12 weeks  1.  Disability index score of 22% or less on oswestry to assist with reaching prior level of function. [] Progressing: [] Met: [] Not Met: [] Adjusted   2. Patient will demonstrate increased AROM to WNL, good LS mobility, good hip ROM to allow for proper joint functioning as indicated by patients Functional Deficits. [] Progressing: [] Met: [] Not Met: [] Adjusted   3. Patient will demonstrate an increase in Strength to good proximal hip and core activation to allow for proper functional mobility as indicated by patients Functional Deficits. [] Progressing: [] Met: [] Not Met: [] Adjusted   4. Patient will return to dressing and hygiene ADLs  without increased symptoms or restriction. [] Progressing: [] Met: [] Not Met: [] Adjusted        Overall Progression Towards Functional goals/ Treatment Progress Update:  [] Patient is progressing as expected towards functional goals listed. [] Progression is slowed due to complexities/Impairments listed. [] Progression has been slowed due to co-morbidities. [x] Plan just implemented, too soon to assess goals progression <30days   [] Goals require adjustment due to lack of progress  [] Patient is not progressing as expected and requires additional follow up with physician  [] Other    Prognosis for POC: [x] Good [] Fair  [] Poor      Patient requires continued skilled intervention: [x] Yes  [] No      ASSESSMENT:  See eval    Treatment/Activity Tolerance:  [x] Patient tolerated treatment well [] Patient limited by fatigue  [] Patient limited by pain  [] Patient limited by other medical complications  [] Other:       PLAN: See eval  [x] Continue per plan of care [] Alter current plan (see comments above)  [] Plan of care initiated [] Hold pending MD visit [] Discharge      Electronically signed by:  Daysi Mcarthur PTA     Note: If patient does not return for scheduled/ recommended follow up visits, this note will serve as a discharge from care along with most recent update on progress.

## 2023-02-06 ENCOUNTER — HOSPITAL ENCOUNTER (OUTPATIENT)
Dept: PHYSICAL THERAPY | Age: 48
Setting detail: THERAPIES SERIES
Discharge: HOME | End: 2023-02-06
Payer: COMMERCIAL

## 2023-02-06 PROCEDURE — 97110 THERAPEUTIC EXERCISES: CPT

## 2023-02-06 PROCEDURE — 97140 MANUAL THERAPY 1/> REGIONS: CPT

## 2023-02-13 ENCOUNTER — HOSPITAL ENCOUNTER (OUTPATIENT)
Dept: PHYSICAL THERAPY | Age: 48
Setting detail: THERAPIES SERIES
Discharge: HOME | End: 2023-02-13
Payer: COMMERCIAL

## 2023-02-13 PROCEDURE — 97110 THERAPEUTIC EXERCISES: CPT

## 2023-02-13 PROCEDURE — 97140 MANUAL THERAPY 1/> REGIONS: CPT

## 2023-02-13 NOTE — FLOWSHEET NOTE
723 [facility] and [address] [place] [address] 904 [facility], [address] [place] [address]  Phone: [phone], [phone]    Physical Therapy Daily Treatment Note  Date:  2023    Patient Name:  Tracee Benitez    :  1975  MRN: [id number]  Restrictions/Precautions:    Physician Information:  Basil Srinivasan,*  Medical/Treatment Diagnosis Information:  Diagnosis: Low back pain M54.5  Treatment Diagnosis: M54.5 low back pain; generalized weakness M62.8  [x] Conservative / [] Surgical - DOS:  Therapy Diagnosis/Practice Pattern:  Practice Pattern F: Spinal Disorders  Insurance/Certification information:  PT Insurance Information: [place] DED $550 BMN no auth  Plan of care signed: [] YES  [x] NO  Number of Comorbidities:  []0     [x]1-2    []3+  Date of Patient follow up with Physician:     Is this a Progress Report:     []  Yes  [x]  No        If Yes:  Date Range for reporting period:  Beginning 2023  Ending     Progress report will be due (10 Rx or 30 days whichever is less):        Recertification will be due (POC Duration  / 90 days whichever is less): 2023      Latex Allergy:  [x]NO      []YES  Preferred Language for Healthcare:   [x]English       []other:    Visit # Insurance Allowable   4 BMN  auth []no        []yes:     SUBJECTIVE:  Pt reports improved mobility, slowly improving pain. Continues to over do it in between sessions.      OBJECTIVE:   Observation:  Palpation:    Test used Initial score Current Score   VAS     FOTO     Lumbar Flexion     Lumbar Ext     Lumbar SB L/R     Lumbar rotation L/R     Hip ext     ABD     TrA       RESTRICTIONS/PRECAUTIONS:     Exercises/Interventions:     Therapeutic Ex/NMR re-education  sets/reps Notes   LTR 10 x 10\"    HS floss    Single knee to chest  5 x 10\"    Double knee to chest 5 x 10    Alissa pose 3D 3 x 15\"    TA brace    Bridge 2 x 10 LVL   SLR with ab set       Sidelying clamshells  X20 LVL   [place] 15 x OVL   T-Band B ER  Red x30     T-Band Row / Extension Green x30     Heel raise 30 x    Wall sit 10 x 10\"                      Shoulder hep review        Manual Intervention      Joint mobs grade 1-2, STM to paraspinals, lateral gapping R side lying 15 min Focused L L3-4   HS Stretch                 Access Code: [email or web address]  URL: [employer]/  Date: 01/27/2023  Prepared by: Fernandez Rocha    Exercises  Supine Lower Trunk Rotation - 1 x daily - 15 reps - 3 hold  Hamstring floss - 1 x daily - 15 reps - 3 hold  Child's Pose Stretch - 1 x daily - 3 reps - 15 hold  Child's Pose with Sidebending - 1 x daily - 3 reps - 15 hold  Supine Transversus Abdominis Bracing - Hands on Stomach - 1 x daily - 15 reps - 5 hold  Supine Bridge - 1 x daily - 2 sets - 10 reps - 3 hold      Therapeutic Exercise and NMR EXR  [x] (00583) Provided verbal/tactile cueing for activities related to strengthening, flexibility, endurance, ROM  for improvements in proximal hip and core control with self care, mobility, lifting and ambulation.  [] (19780) Provided verbal/tactile cueing for activities related to improving balance, coordination, kinesthetic sense, posture, motor skill, proprioception  to assist with core control in self care, mobility, lifting, and ambulation.      Therapeutic Activities:    [] (96037 or 47241) Provided verbal/tactile cueing for activities related to improving balance, coordination, kinesthetic sense, posture, motor skill, proprioception and motor activation to allow for proper function  with self care and ADLs  [] (24906) Provided training and instruction to the patient for proper core and proximal hip recruitment and positioning with ambulation re-education     Home Exercise Program:    [x] (90433) Reviewed/Progressed HEP activities related to strengthening, flexibility, endurance, ROM of core, proximal hip and LE for functional self-care, mobility, lifting and ambulation   [] (01866) Reviewed/Progressed HEP activities related to improving balance, coordination, kinesthetic sense, posture, motor skill, proprioception of core, proximal hip and LE for self care, mobility, lifting, and ambulation      Manual Treatments:  PROM / STM / Oscillations-Mobs:  G-I, II, III, IV (PA's, Inf., Post.)  [x] (38197) Provided manual therapy to mobilize proximal hip and LS spine soft tissue/joints for the purpose of modulating pain, promoting relaxation,  increasing ROM, reducing/eliminating soft tissue swelling/inflammation/restriction, improving soft tissue extensibility and allowing for proper ROM for normal function with self care, mobility, lifting and ambulation. Modalities:      [] GR/ESU 15 min    [] GR 15 min  [] ESU     [] CP    [] MHP    [x] declined  Charges:  Timed Code Treatment Minutes: 45   Total Treatment Minutes: 45     [] EVAL (LOW) 97150 (typically 20 minutes face-to-face)  [] EVAL (MOD) 83366 (typically 30 minutes face-to-face)  [] EVAL (HIGH) 06078 (typically 45 minutes face-to-face)  [] RE-EVAL     [x] SC(38979) x 2    [] IONTO  [] NMR (34756) x     [] VASO  [x] Manual (23848) x 1     [] Other:  [] TA x      [] Mech Traction (47796)  [] ES(attended) (42029)      [] ES (un) (32289):     Goals: Patient stated goal: Pain free activity and care for grandchildren   [] Progressing: [] Met: [] Not Met: [] Adjusted     Therapist goals for Patient:   Short Term Goals: To be achieved in: 2 weeks  1. Independent in HEP and progression per patient tolerance, in order to prevent re-injury. [] Progressing: [] Met: [] Not Met: [] Adjusted   2. Patient will have a decrease in pain to facilitate improvement in movement, function, and ADLs as indicated by Functional Deficits. [] Progressing: [] Met: [] Not Met: [] Adjusted     Long Term Goals: To be achieved in: 12 weeks  1. Disability index score of 22% or less on oswestry to assist with reaching prior level of function.    [] Progressing: [] Met: [] Not Met: [] Adjusted   2. Patient will demonstrate increased AROM to WNL, good LS mobility, good hip ROM to allow for proper joint functioning as indicated by patients Functional Deficits. [] Progressing: [] Met: [] Not Met: [] Adjusted   3. Patient will demonstrate an increase in Strength to good proximal hip and core activation to allow for proper functional mobility as indicated by patients Functional Deficits. [] Progressing: [] Met: [] Not Met: [] Adjusted   4. Patient will return to dressing and hygiene ADLs  without increased symptoms or restriction. [] Progressing: [] Met: [] Not Met: [] Adjusted        Overall Progression Towards Functional goals/ Treatment Progress Update:  [] Patient is progressing as expected towards functional goals listed. [] Progression is slowed due to complexities/Impairments listed. [] Progression has been slowed due to co-morbidities. [x] Plan just implemented, too soon to assess goals progression <30days   [] Goals require adjustment due to lack of progress  [] Patient is not progressing as expected and requires additional follow up with physician  [] Other    Prognosis for POC: [x] Good [] Fair  [] Poor      Patient requires continued skilled intervention: [x] Yes  [] No      ASSESSMENT:  Pt progressing slowly. Is demonstrating improved mobility and strength, self reported improvement in pain as well. Shoulder also improving.     Treatment/Activity Tolerance:  [x] Patient tolerated treatment well [] Patient limited by fatigue  [] Patient limited by pain  [] Patient limited by other medical complications  [] Other:       PLAN: See eval  [x] Continue per plan of care [] Alter current plan (see comments above)  [] Plan of care initiated [] Hold pending MD visit [] Discharge      Electronically signed by:  Melly Birch PT     Note: If patient does not return for scheduled/ recommended follow up visits, this note will serve as a discharge from care along with most recent update on progress.

## 2023-02-20 ENCOUNTER — HOSPITAL ENCOUNTER (OUTPATIENT)
Dept: PHYSICAL THERAPY | Age: 48
Setting detail: THERAPIES SERIES
Discharge: HOME | End: 2023-02-20
Payer: COMMERCIAL

## 2023-02-20 NOTE — FLOWSHEET NOTE
[facility], [facility])    Physical Therapy  Cancellation/No-show Note  Patient Name:  Debra Madrigal  :  1975   Date:  2023    Cancelled visits to date: 2  No-shows to date: 0    For today's appointment patient:  [x]  Cancelled  []  Rescheduled appointment  []  No-show     Reason given by patient:  [x]  Patient ill  []  Conflicting appointment  []  No transportation    []  Conflict with work  []  No reason given  []  Other:     Comments:  migraine     Phone call information:   []  Phone call made today to patient. []  Patient answered, conversation as follows:    []  Patient did not answer. [x]  Phone call not needed - pt contacted us to cancel and provided reason for cancellation.      Electronically signed by:  Juan Coughlin PT,

## 2023-02-27 ENCOUNTER — HOSPITAL ENCOUNTER (OUTPATIENT)
Dept: PHYSICAL THERAPY | Age: 48
Setting detail: THERAPIES SERIES
Discharge: HOME | End: 2023-02-27
Payer: COMMERCIAL

## 2023-02-27 PROCEDURE — 97110 THERAPEUTIC EXERCISES: CPT | Performed by: PHYSICAL THERAPY ASSISTANT

## 2023-02-27 PROCEDURE — 97140 MANUAL THERAPY 1/> REGIONS: CPT | Performed by: PHYSICAL THERAPY ASSISTANT

## 2023-02-27 NOTE — FLOWSHEET NOTE
[address] [address], [address], [place], [address]  Phone: [phone], [phone]  Date: 2023          Patient Name; :  Jose Potts; 1975   Dx/ICD Code:  Diagnosis: Low back pain M54.5  Treatment Diagnosis: M54.5 low back pain; generalized weakness M62.8                     Physician:  Sharon Serna,*        Total PT Visits: 5     Measures Previous Current   Pain (0-10) 0-510 0-2/10   Disability % Mod OS Low Back:   = 44% Mod OS Low Back:   = 10%   FOTO Score     ROM  WNL        Strength  WNL               Specific Functional Improvements & Impressions:  Jenny Kan states she is feeling much better. She is sleeping better and general function through the day is improved. At this point symptoms are present with overdoing activity or with sitting too long. She no longer has pain all day long. Symptoms are in the left low back when present. She would like to try on her own for 2 weeks and if she continues to do well she will call to cancel her last apt. If she feels like she is still having issues she will come in for two week follow-up. Plan & Recommendations:  [x] Continue rehabilitation due to objective improvement and continued functional deficits with frequency and duration: 1 time a week for 4 weeks if needed after two weeks of at home HEP  [] Progress toward  []GAP, []Work Conditioning, []Independent HEP   [] Discharge due to   [] All goals achieved, [] Maximized \"medical necessity\" [] No subjective or objective improvements      Electronically signed by:  Misha Siddiqi PTA  Therapy Plan of Care Re-Certification  This patient has been re-evaluated for physical therapy services and for therapy to continue, Medicare, Medicaid and other insurances require periodic physician review of the treatment plan.  Please review the above re-evaluation and verify that you agree with plan of care as established above by signing the attached document and return it to our office or note changes to established plan below  [] Follow treatment plan as above [] Discontinue physical therapy  [] Change plan to:                                 __________________________________________________    Physician Signature:____________________________________ Date:____________  By signing above, therapists plan is approved by physician    If you have any questions or concerns, please don't hesitate to call. Thank you for your referral.    [facility] [address] office  ([phone])     Fax [phone]     Physical Therapy Daily Treatment Note  Date:  2023    Patient Name:  Kamila Mensah    :  1975  MRN: [id number]  Restrictions/Precautions:    Physician Information:  Zofia Snider,*  Medical/Treatment Diagnosis Information:  Diagnosis: Low back pain M54.5  Treatment Diagnosis: M54.5 low back pain; generalized weakness M62.8  [x] Conservative / [] Surgical - DOS:  Therapy Diagnosis/Practice Pattern:  Practice Pattern F: Spinal Disorders  Insurance/Certification information:  PT Insurance Information: [place] DED $550 BMN no auth  Plan of care signed: [] YES  [x] NO  Number of Comorbidities:  []0     [x]1-2    []3+  Date of Patient follow up with Physician:     Is this a Progress Report:     []  Yes  [x]  No        If Yes:  Date Range for reporting period:  Beginning 2023  Ending     Progress report will be due (10 Rx or 30 days whichever is less): 3/60/3988       Recertification will be due (POC Duration  / 90 days whichever is less): 2023      Latex Allergy:  [x]NO      []YES  Preferred Language for Healthcare:   [x]English       []other:    Visit # Insurance Allowable   5 BMN  auth []no        []yes:     SUBJECTIVE:  States she is feeling much better. She is sleeping better and general function through the day is improved.   At this point symptoms are present with overdoing activity or with sitting too long. She no longer has pain all day long. Symptoms are in the left low back when present. She would like to try on her own for 2 weeks and if she continues to do well she will call to cancel her last apt. If she feels like she is still having issues she will come in for two week follow-up. OSWESTRY - 10%  (Total 5/50)    OBJECTIVE:   Observation:  Palpation:    Test used Initial score Current Score   VAS     FOTO     Lumbar Flexion     Lumbar Ext     Lumbar SB L/R     Lumbar rotation L/R     Hip ext     ABD     TrA       RESTRICTIONS/PRECAUTIONS:     Exercises/Interventions:     Therapeutic Ex/NMR re-education  sets/reps Notes   LTR 10 x 10\"    HS floss    Single knee to chest  5 x 10\"    Double knee to chest 5 x 10    Alissa pose 3D 3 x 15\"    TA brace    Bridge 5\" hold  2 x 10 Green   SLR with ab set       Sidelying clamshells  X20  Green   [place] 15 x OVL   T-Band B ER / B Hor ABD  Red x20 ea  seated on SB     T-Band Row / Extension Green x30     Heel raise 30 x    Wall sit 10 x 10\"    DALE Hip ABD  45# x20 ea        FSU 6\" x20 ea R, L          Shoulder hep review        Manual Intervention      Joint mobs grade 1-2, STM to paraspinals, lateral gapping R side lying 15 min Focused L L3-4   HS Stretch                 Access Code: [email or web address]  URL: [email or web address]  Date: 01/27/2023  Prepared by: Taye Hart    Exercises  Supine Lower Trunk Rotation - 1 x daily - 15 reps - 3 hold  Hamstring floss - 1 x daily - 15 reps - 3 hold  Child's Pose Stretch - 1 x daily - 3 reps - 15 hold  Child's Pose with Sidebending - 1 x daily - 3 reps - 15 hold  Supine Transversus Abdominis Bracing - Hands on Stomach - 1 x daily - 15 reps - 5 hold  Supine Bridge - 1 x daily - 2 sets - 10 reps - 3 hold      Therapeutic Exercise and NMR EXR  [x] (38837) Provided verbal/tactile cueing for activities related to strengthening, flexibility, endurance, ROM  for improvements in proximal hip and core control with self care, mobility, lifting and ambulation.  [] (25142) Provided verbal/tactile cueing for activities related to improving balance, coordination, kinesthetic sense, posture, motor skill, proprioception  to assist with core control in self care, mobility, lifting, and ambulation. Therapeutic Activities:    [] (55748 or 50884) Provided verbal/tactile cueing for activities related to improving balance, coordination, kinesthetic sense, posture, motor skill, proprioception and motor activation to allow for proper function  with self care and ADLs  [] (31253) Provided training and instruction to the patient for proper core and proximal hip recruitment and positioning with ambulation re-education     Home Exercise Program:    [x] (60646) Reviewed/Progressed HEP activities related to strengthening, flexibility, endurance, ROM of core, proximal hip and LE for functional self-care, mobility, lifting and ambulation   [] (38547) Reviewed/Progressed HEP activities related to improving balance, coordination, kinesthetic sense, posture, motor skill, proprioception of core, proximal hip and LE for self care, mobility, lifting, and ambulation      Manual Treatments:  PROM / STM / Oscillations-Mobs:  G-I, II, III, IV (PA's, Inf., Post.)  [x] (12180) Provided manual therapy to mobilize proximal hip and LS spine soft tissue/joints for the purpose of modulating pain, promoting relaxation,  increasing ROM, reducing/eliminating soft tissue swelling/inflammation/restriction, improving soft tissue extensibility and allowing for proper ROM for normal function with self care, mobility, lifting and ambulation.      Modalities:      [] GR/ESU 15 min    [] GR 15 min  [] ESU     [] CP    [] MHP    [x] declined  Charges:  Timed Code Treatment Minutes: 45   Total Treatment Minutes: 45     [] EVAL (LOW) 04403 (typically 20 minutes face-to-face)  [] EVAL (MOD) 22304 (typically 30 minutes face-to-face)  [] EVAL (HIGH) 423 4899 (typically 45 minutes face-to-face)  [] RE-EVAL     [x] TE(00915) x 2    [] IONTO  [] NMR (94070) x     [] VASO  [x] Manual (73636) x 1     [] Other:  [] TA x      [] Mech Traction (28479)  [] ES(attended) (45110)      [] ES (un) (21952):     Goals: Patient stated goal: Pain free activity and care for grandchildren   [x] Progressing: [] Met: [] Not Met: [] Adjusted     Therapist goals for Patient:   Short Term Goals: To be achieved in: 2 weeks  1. Independent in HEP and progression per patient tolerance, in order to prevent re-injury.   [] Progressing: [x] Met: [] Not Met: [] Adjusted   2. Patient will have a decrease in pain to facilitate improvement in movement, function, and ADLs as indicated by Functional Deficits.  [] Progressing: [x] Met: [] Not Met: [] Adjusted     Long Term Goals: To be achieved in: 12 weeks  1. Disability index score of 22% or less on oswestry to assist with reaching prior level of function.   [] Progressing: [x] Met: [] Not Met: [] Adjusted   2. Patient will demonstrate increased AROM to WNL, good LS mobility, good hip ROM to allow for proper joint functioning as indicated by patients Functional Deficits.   [] Progressing: [x] Met: [] Not Met: [] Adjusted   3. Patient will demonstrate an increase in Strength to good proximal hip and core activation to allow for proper functional mobility as indicated by patients Functional Deficits.   [] Progressing: [x] Met: [] Not Met: [] Adjusted   4. Patient will return to dressing and hygiene ADLs  without increased symptoms or restriction.   [x] Progressing: [] Met: [] Not Met: [] Adjusted        Overall Progression Towards Functional goals/ Treatment Progress Update:  [x] Patient is progressing as expected towards functional goals listed.    [] Progression is slowed due to complexities/Impairments listed.  [] Progression has been slowed due to co-morbidities.  [] Plan just implemented, too soon to assess goals progression <30days   [] Goals require adjustment due  to lack of progress  [] Patient is not progressing as expected and requires additional follow up with physician  [] Other    Prognosis for POC: [x] Good [] Fair  [] Poor      Patient requires continued skilled intervention: [x] Yes  [] No      ASSESSMENT:  Pt progressing slowly. Is demonstrating improved mobility and strength, self reported improvement in pain as well. Shoulder also improving. Treatment/Activity Tolerance:  [x] Patient tolerated treatment well [] Patient limited by fatigue  [] Patient limited by pain  [] Patient limited by other medical complications  [] Other:       PLAN: See eval  [x] Continue per plan of care [] Alter current plan (see comments above)  [] Plan of care initiated [] Hold pending MD visit [] Discharge      Electronically signed by:  Misha Siddiqi PTA     Note: If patient does not return for scheduled/ recommended follow up visits, this note will serve as a discharge from care along with most recent update on progress.

## 2023-03-13 ENCOUNTER — HOSPITAL ENCOUNTER (OUTPATIENT)
Dept: PHYSICAL THERAPY | Age: 48
Setting detail: THERAPIES SERIES
Discharge: HOME | End: 2023-03-13

## 2023-03-13 NOTE — FLOWSHEET NOTE
[facility], [facility] ([facility])    Physical Therapy  Cancellation/No-show Note  Patient Name:  Cindy Pierre  :  1975   Date:  3/13/2023    Cancelled visits to date: 0  No-shows to date: 0    For today's appointment patient:  []  Cancelled  []  Rescheduled appointment  [x]  No-show     Reason given by patient:  []  Patient ill  []  Conflicting appointment  []  No transportation    []  Conflict with work  []  No reason given  []  Other:     Comments:      Phone call information:   []  Phone call made today to patient. []  Patient answered, conversation as follows:    []  Patient did not answer. [x]  Phone call not needed - We had discussed last visit that if she is feeling good she would call to D/C PT. She did not call but it is assumed from last visit that she will D/C at this point.       Electronically signed by:  Renea Ganser, PTA,

## 2023-08-09 NOTE — FLOWSHEET NOTE
[address] [facility] and [address] [facility], [address] [place] [address]  Phone: [phone], [phone]    Physical Therapy Daily Treatment Note  Date:  2023    Patient Name:  Sylvia Hernandez    :  1975  MRN: [id number]  Restrictions/Precautions:    Physician Information:  Keren Olmos,*  Medical/Treatment Diagnosis Information:  Diagnosis: Low back pain M54.5  Treatment Diagnosis: M54.5 low back pain; generalized weakness M62.8  [x] Conservative / [] Surgical - DOS:  Therapy Diagnosis/Practice Pattern:  Practice Pattern F: Spinal Disorders  Insurance/Certification information:  PT Insurance Information: [place] DED $550 BMN no auth  Plan of care signed: [] YES  [x] NO  Number of Comorbidities:  []0     [x]1-2    []3+  Date of Patient follow up with Physician:     Is this a Progress Report:     []  Yes  [x]  No        If Yes:  Date Range for reporting period:  Beginning 2023  Ending     Progress report will be due (10 Rx or 30 days whichever is less):        Recertification will be due (POC Duration  / 90 days whichever is less): 2023      Latex Allergy:  [x]NO      []YES  Preferred Language for Healthcare:   [x]English       []other:    Visit # Insurance Allowable   3 BMN  auth []no        []yes:       SUBJECTIVE:  Pt states she had to do deep cleaning at her office for 7 hours and has general soreness all over. Pt reports she felt a little better after last visit but states she really overdid it yesterday. Pt states L sided lower back pain was 6/10.      OBJECTIVE: See eval  Observation:  Palpation:    Test used Initial score Current Score   VAS     FOTO     Lumbar Flexion     Lumbar Ext     Lumbar SB L/R     Lumbar rotation L/R     Hip ext     ABD     TrA       RESTRICTIONS/PRECAUTIONS:     Exercises/Interventions:     Therapeutic Ex/NMR re-education  sets/reps Notes   LTR 10 x 10\"    HS floss 15 x    Single knee to chest  10 x 10\"         Marcelo Ace pose 3D 3 x 15\"    TA brace 15 x    Bridge 2 x 10    SLR with ab set  2x10     Sidelying clamshells  X20          T-Band B ER  Red x30     T-Band Row / Extension Green x30                    MHP applied during supine  15'              Shoulder hep review     PT education on anatomy, scoliosis, pathology, PT progression 10 min    Manual Intervention      Joint mobs grade 1-2, STM to paraspinals, lateral gapping R side lying 15 min Focused L L3-4   HS Stretch  5'                Access Code: [email or web address]  URL: [email or web address]  Date: 01/27/2023  Prepared by: Seth Avitia    Exercises  Supine Lower Trunk Rotation - 1 x daily - 15 reps - 3 hold  Hamstring floss - 1 x daily - 15 reps - 3 hold  Child's Pose Stretch - 1 x daily - 3 reps - 15 hold  Child's Pose with Sidebending - 1 x daily - 3 reps - 15 hold  Supine Transversus Abdominis Bracing - Hands on Stomach - 1 x daily - 15 reps - 5 hold  Supine Bridge - 1 x daily - 2 sets - 10 reps - 3 hold      Therapeutic Exercise and NMR EXR  [x] (92359) Provided verbal/tactile cueing for activities related to strengthening, flexibility, endurance, ROM  for improvements in proximal hip and core control with self care, mobility, lifting and ambulation.  [] (53648) Provided verbal/tactile cueing for activities related to improving balance, coordination, kinesthetic sense, posture, motor skill, proprioception  to assist with core control in self care, mobility, lifting, and ambulation.      Therapeutic Activities:    [] (21291 or 06007) Provided verbal/tactile cueing for activities related to improving balance, coordination, kinesthetic sense, posture, motor skill, proprioception and motor activation to allow for proper function  with self care and ADLs  [] (08631) Provided training and instruction to the patient for proper core and proximal hip recruitment and positioning with ambulation re-education     Home Exercise Program:    [x] (44104) Reviewed/Progressed HEP activities related to strengthening, flexibility, endurance, ROM of core, proximal hip and LE for functional self-care, mobility, lifting and ambulation   [] (98809) Reviewed/Progressed HEP activities related to improving balance, coordination, kinesthetic sense, posture, motor skill, proprioception of core, proximal hip and LE for self care, mobility, lifting, and ambulation      Manual Treatments:  PROM / STM / Oscillations-Mobs:  G-I, II, III, IV (PA's, Inf., Post.)  [x] (64284) Provided manual therapy to mobilize proximal hip and LS spine soft tissue/joints for the purpose of modulating pain, promoting relaxation,  increasing ROM, reducing/eliminating soft tissue swelling/inflammation/restriction, improving soft tissue extensibility and allowing for proper ROM for normal function with self care, mobility, lifting and ambulation. Modalities:      [] GR/ESU 15 min    [] GR 15 min  [] ESU     [] CP    [] MHP    [x] declined  Charges:  Timed Code Treatment Minutes: 45   Total Treatment Minutes: 45     [] EVAL (LOW) 61943 (typically 20 minutes face-to-face)  [] EVAL (MOD) 74253 (typically 30 minutes face-to-face)  [] EVAL (HIGH) 06125 (typically 45 minutes face-to-face)  [] RE-EVAL     [x] MO(72173) x 2    [] IONTO  [] NMR (56035) x     [] VASO  [x] Manual (80894) x 1     [] Other:  [] TA x      [] Mech Traction (79685)  [] ES(attended) (84941)      [] ES (un) (04337):     Goals: Patient stated goal: Pain free activity and care for grandchildren   [] Progressing: [] Met: [] Not Met: [] Adjusted     Therapist goals for Patient:   Short Term Goals: To be achieved in: 2 weeks  1. Independent in HEP and progression per patient tolerance, in order to prevent re-injury. [] Progressing: [] Met: [] Not Met: [] Adjusted   2. Patient will have a decrease in pain to facilitate improvement in movement, function, and ADLs as indicated by Functional Deficits.   [] Progressing: [] Met: [] Not Met: [] Adjusted     Long Term Goals: To be achieved in: 12 weeks  1. Disability index score of 22% or less on oswestry to assist with reaching prior level of function. [] Progressing: [] Met: [] Not Met: [] Adjusted   2. Patient will demonstrate increased AROM to WNL, good LS mobility, good hip ROM to allow for proper joint functioning as indicated by patients Functional Deficits. [] Progressing: [] Met: [] Not Met: [] Adjusted   3. Patient will demonstrate an increase in Strength to good proximal hip and core activation to allow for proper functional mobility as indicated by patients Functional Deficits. [] Progressing: [] Met: [] Not Met: [] Adjusted   4. Patient will return to dressing and hygiene ADLs  without increased symptoms or restriction. [] Progressing: [] Met: [] Not Met: [] Adjusted        Overall Progression Towards Functional goals/ Treatment Progress Update:  [] Patient is progressing as expected towards functional goals listed. [] Progression is slowed due to complexities/Impairments listed. [] Progression has been slowed due to co-morbidities.   [x] Plan just implemented, too soon to assess goals progression <30days   [] Goals require adjustment due to lack of progress  [] Patient is not progressing as expected and requires additional follow up with physician  [] Other    Prognosis for POC: [x] Good [] Fair  [] Poor      Patient requires continued skilled intervention: [x] Yes  [] No      ASSESSMENT:  See eval    Treatment/Activity Tolerance:  [x] Patient tolerated treatment well [] Patient limited by fatigue  [] Patient limited by pain  [] Patient limited by other medical complications  [] Other:       PLAN: See eval  [x] Continue per plan of care [] Alter current plan (see comments above)  [] Plan of care initiated [] Hold pending MD visit [] Discharge      Electronically signed by:  Ted Morrow PTA     Note: If patient does not return for scheduled/ recommended follow up visits, this note will serve as a discharge from care along with most recent update on progress. Benzoyl Peroxide Pregnancy And Lactation Text: This medication is Pregnancy Category C. It is unknown if benzoyl peroxide is excreted in breast milk.

## 2023-11-21 ENCOUNTER — HOSPITAL ENCOUNTER (OUTPATIENT)
Dept: MAMMOGRAPHY | Age: 48
Discharge: HOME | End: 2023-11-21
Payer: COMMERCIAL

## 2023-11-21 DIAGNOSIS — Z12.39 SCREENING BREAST EXAMINATION: ICD-10-CM

## 2023-11-21 PROCEDURE — 77063 BREAST TOMOSYNTHESIS BI: CPT

## 2024-10-31 ENCOUNTER — APPOINTMENT (OUTPATIENT)
Dept: CT IMAGING | Age: 49
End: 2024-10-31
Payer: OTHER MISCELLANEOUS

## 2024-10-31 ENCOUNTER — HOSPITAL ENCOUNTER (EMERGENCY)
Age: 49
Discharge: HOME | End: 2024-10-31
Attending: EMERGENCY MEDICINE
Payer: OTHER MISCELLANEOUS

## 2024-10-31 VITALS
OXYGEN SATURATION: 100 % | BODY MASS INDEX: 25.83 KG/M2 | SYSTOLIC BLOOD PRESSURE: 119 MMHG | HEIGHT: 62 IN | HEART RATE: 98 BPM | WEIGHT: 140.4 LBS | RESPIRATION RATE: 16 BRPM | DIASTOLIC BLOOD PRESSURE: 94 MMHG | TEMPERATURE: 98.3 F

## 2024-10-31 DIAGNOSIS — S09.90XA CLOSED HEAD INJURY, INITIAL ENCOUNTER: ICD-10-CM

## 2024-10-31 DIAGNOSIS — S16.1XXA ACUTE STRAIN OF NECK MUSCLE, INITIAL ENCOUNTER: ICD-10-CM
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
## 2024-10-31 PROCEDURE — 70450 CT HEAD/BRAIN W/O DYE: CPT

## 2024-10-31 PROCEDURE — 6370000000 HC RX 637 (ALT 250 FOR IP): Performed by: EMERGENCY MEDICINE

## 2024-10-31 PROCEDURE — 99284 EMERGENCY DEPT VISIT MOD MDM: CPT

## 2024-10-31 PROCEDURE — 72125 CT NECK SPINE W/O DYE: CPT

## 2024-10-31 RX ORDER — HYDROCODONE BITARTRATE AND ACETAMINOPHEN 5; 325 MG/1; MG/1
1 TABLET ORAL EVERY 6 HOURS PRN
Qty: 12 TABLET | Refills: 0 | Status: SHIPPED | OUTPATIENT
Start: 2024-10-31 | End: 2024-11-03

## 2024-10-31 RX ORDER — IBUPROFEN 600 MG/1
600 TABLET, FILM COATED ORAL ONCE
Status: DISCONTINUED | OUTPATIENT
Start: 2024-10-31 | End: 2024-10-31 | Stop reason: HOSPADM

## 2024-10-31 RX ORDER — HYDROCODONE BITARTRATE AND ACETAMINOPHEN 5; 325 MG/1; MG/1
1 TABLET ORAL ONCE
Status: COMPLETED | OUTPATIENT
Start: 2024-10-31 | End: 2024-10-31

## 2024-10-31 RX ORDER — CYCLOBENZAPRINE HCL 10 MG
10 TABLET ORAL 3 TIMES DAILY PRN
Qty: 21 TABLET | Refills: 0 | Status: SHIPPED | OUTPATIENT
Start: 2024-10-31 | End: 2024-11-10

## 2024-10-31 RX ORDER — HYDROCHLOROTHIAZIDE 12.5 MG/1
1 TABLET ORAL DAILY
COMMUNITY

## 2024-10-31 RX ORDER — FLUOXETINE 20 MG/1
TABLET ORAL
COMMUNITY

## 2024-10-31 RX ADMIN — HYDROCODONE BITARTRATE AND ACETAMINOPHEN 1 TABLET: 5; 325 TABLET ORAL at 15:16

## 2024-10-31 ASSESSMENT — LIFESTYLE VARIABLES
HOW OFTEN DO YOU HAVE A DRINK CONTAINING ALCOHOL: NEVER
HOW MANY STANDARD DRINKS CONTAINING ALCOHOL DO YOU HAVE ON A TYPICAL DAY: PATIENT DOES NOT DRINK

## 2024-10-31 ASSESSMENT — PAIN - FUNCTIONAL ASSESSMENT: PAIN_FUNCTIONAL_ASSESSMENT: 0-10

## 2024-10-31 ASSESSMENT — PAIN SCALES - GENERAL: PAINLEVEL_OUTOF10: 7

## 2024-10-31 ASSESSMENT — PAIN DESCRIPTION - LOCATION: LOCATION: HEAD
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
## 2025-01-17 ENCOUNTER — HOSPITAL ENCOUNTER (OUTPATIENT)
Dept: MAMMOGRAPHY | Age: 50
Discharge: HOME | End: 2025-01-17
Payer: COMMERCIAL

## 2025-01-17 VITALS — WEIGHT: 145 LBS | HEIGHT: 62 IN | BODY MASS INDEX: 26.68 KG/M2

## 2025-01-17 DIAGNOSIS — Z12.31 SCREENING MAMMOGRAM, ENCOUNTER FOR: ICD-10-CM

## 2025-01-17 PROCEDURE — 77063 BREAST TOMOSYNTHESIS BI: CPT

## 2025-08-15 ENCOUNTER — HOSPITAL ENCOUNTER (OUTPATIENT)
Dept: LAB | Age: 50
Discharge: HOME | End: 2025-08-15
Payer: COMMERCIAL

## 2025-08-15 ENCOUNTER — HOSPITAL ENCOUNTER (OUTPATIENT)
Age: 50
Discharge: HOME | End: 2025-08-15
Payer: COMMERCIAL

## 2025-08-15 LAB
ANION GAP SERPL CALCULATED.3IONS-SCNC: 11 MMOL/L (ref 3–16)
BUN SERPL-MCNC: 14 MG/DL (ref 7–20)
CALCIUM SERPL-MCNC: 9.4 MG/DL (ref 8.3–10.6)
CHLORIDE SERPL-SCNC: 98 MMOL/L (ref 99–110)
CO2 SERPL-SCNC: 26 MMOL/L (ref 21–32)
CREAT SERPL-MCNC: 0.6 MG/DL (ref 0.6–1.1)
EKG ATRIAL RATE: 86 BPM
EKG DIAGNOSIS: NORMAL
EKG P AXIS: 73 DEGREES
EKG P-R INTERVAL: 150 MS
EKG Q-T INTERVAL: 382 MS
EKG QRS DURATION: 80 MS
EKG QTC CALCULATION (BAZETT): 457 MS
EKG R AXIS: 6 DEGREES
EKG T AXIS: 22 DEGREES
EKG VENTRICULAR RATE: 86 BPM
GFR SERPLBLD CREATININE-BSD FMLA CKD-EPI: >90 ML/MIN/{1.73_M2}
GLUCOSE SERPL-MCNC: 102 MG/DL (ref 70–99)
POTASSIUM SERPL-SCNC: 3.1 MMOL/L (ref 3.5–5.1)
SODIUM SERPL-SCNC: 135 MMOL/L (ref 136–145)

## 2025-08-15 PROCEDURE — 36415 COLL VENOUS BLD VENIPUNCTURE: CPT

## 2025-08-15 PROCEDURE — 93010 ELECTROCARDIOGRAM REPORT: CPT | Performed by: INTERNAL MEDICINE

## 2025-08-15 PROCEDURE — 93005 ELECTROCARDIOGRAM TRACING: CPT | Performed by: SPECIALIST

## 2025-08-15 PROCEDURE — 80048 BASIC METABOLIC PNL TOTAL CA: CPT
